# Patient Record
Sex: FEMALE | Race: BLACK OR AFRICAN AMERICAN | NOT HISPANIC OR LATINO | Employment: FULL TIME
[De-identification: names, ages, dates, MRNs, and addresses within clinical notes are randomized per-mention and may not be internally consistent; named-entity substitution may affect disease eponyms.]

---

## 2017-08-08 ENCOUNTER — LAB SERVICES (OUTPATIENT)
Dept: OTHER | Age: 57
End: 2017-08-08

## 2017-08-08 ENCOUNTER — CHARTING TRANS (OUTPATIENT)
Dept: OTHER | Age: 57
End: 2017-08-08

## 2017-08-08 ASSESSMENT — PAIN SCALES - GENERAL: PAINLEVEL_OUTOF10: 0

## 2017-08-19 ENCOUNTER — IMAGING SERVICES (OUTPATIENT)
Dept: OTHER | Age: 57
End: 2017-08-19

## 2017-08-19 ENCOUNTER — HOSPITAL (OUTPATIENT)
Dept: OTHER | Age: 57
End: 2017-08-19
Attending: INTERNAL MEDICINE

## 2017-08-23 ENCOUNTER — CHARTING TRANS (OUTPATIENT)
Dept: OTHER | Age: 57
End: 2017-08-23

## 2017-08-23 LAB
ALBUMIN SERPL-MCNC: 4.2 G/DL (ref 3.6–5.1)
ALBUMIN/GLOB SERPL: 1.1 (ref 1–2.4)
ALP SERPL-CCNC: 107 UNITS/L (ref 45–117)
ALT SERPL-CCNC: 25 UNITS/L
ANION GAP SERPL CALC-SCNC: 15 MMOL/L (ref 10–20)
AST SERPL-CCNC: 19 UNITS/L
BILIRUB SERPL-MCNC: 0.8 MG/DL (ref 0.2–1)
BUN SERPL-MCNC: 17 MG/DL (ref 6–20)
BUN/CREAT SERPL: 26 (ref 7–25)
CALCIUM SERPL-MCNC: 9.1 MG/DL (ref 8.4–10.2)
CHLORIDE SERPL-SCNC: 109 MMOL/L (ref 98–107)
CHOLEST SERPL-MCNC: 198 MG/DL
CHOLEST/HDLC SERPL: 2.8
CO2 SERPL-SCNC: 23 MMOL/L (ref 21–32)
CREAT SERPL-MCNC: 0.64 MG/DL (ref 0.51–0.95)
GLOBULIN SER-MCNC: 3.9 G/DL (ref 2–4)
GLUCOSE SERPL-MCNC: 82 MG/DL (ref 65–99)
HBA1C MFR BLD: 5.8 % (ref 4.5–5.6)
HDLC SERPL-MCNC: 72 MG/DL
LDLC SERPL CALC-MCNC: 105 MG/DL
LENGTH OF FAST TIME PATIENT: 10 HRS
LENGTH OF FAST TIME PATIENT: 10 HRS
NONHDLC SERPL-MCNC: 126 MG/DL
POTASSIUM SERPL-SCNC: 4.4 MMOL/L (ref 3.4–5.1)
SODIUM SERPL-SCNC: 143 MMOL/L (ref 135–145)
TOTAL PROTEIN: 8.1 G/DL (ref 6.4–8.2)
TRIGL SERPL-MCNC: 106 MG/DL

## 2017-09-02 ENCOUNTER — HOSPITAL (OUTPATIENT)
Dept: OTHER | Age: 57
End: 2017-09-02

## 2017-10-26 DIAGNOSIS — R22.0 LOCALIZED SWELLING, MASS, AND LUMP OF HEAD: Primary | ICD-10-CM

## 2018-04-16 DIAGNOSIS — M79.641 RIGHT HAND PAIN: Primary | ICD-10-CM

## 2018-11-03 VITALS
BODY MASS INDEX: 28.21 KG/M2 | DIASTOLIC BLOOD PRESSURE: 70 MMHG | HEART RATE: 74 BPM | RESPIRATION RATE: 16 BRPM | TEMPERATURE: 97.3 F | HEIGHT: 64 IN | WEIGHT: 165.23 LBS | SYSTOLIC BLOOD PRESSURE: 120 MMHG

## 2019-01-15 ENCOUNTER — TELEPHONE (OUTPATIENT)
Dept: SCHEDULING | Age: 59
End: 2019-01-15

## 2019-02-11 PROBLEM — E78.5 HYPERLIPIDEMIA LDL GOAL <100: Status: ACTIVE | Noted: 2019-02-11

## 2019-02-11 PROBLEM — M25.561 ACUTE PAIN OF BOTH KNEES: Status: ACTIVE | Noted: 2019-02-11

## 2019-02-11 PROBLEM — F31.9 BIPOLAR 1 DISORDER: Status: ACTIVE | Noted: 2019-02-11

## 2019-02-11 PROBLEM — M25.562 ACUTE PAIN OF BOTH KNEES: Status: ACTIVE | Noted: 2019-02-11

## 2019-02-11 PROBLEM — F51.01 PRIMARY INSOMNIA: Status: ACTIVE | Noted: 2019-02-11

## 2019-06-01 ENCOUNTER — HOSPITAL (OUTPATIENT)
Dept: OTHER | Age: 59
End: 2019-06-01
Attending: OBSTETRICS & GYNECOLOGY

## 2019-06-13 ENCOUNTER — HOSPITAL (OUTPATIENT)
Dept: OTHER | Age: 59
End: 2019-06-13
Attending: OBSTETRICS & GYNECOLOGY

## 2019-09-10 ENCOUNTER — HOSPITAL ENCOUNTER (EMERGENCY)
Facility: HOSPITAL | Age: 59
Discharge: HOME | End: 2019-09-10
Attending: EMERGENCY MEDICINE
Payer: COMMERCIAL

## 2019-09-10 VITALS
DIASTOLIC BLOOD PRESSURE: 84 MMHG | TEMPERATURE: 98 F | HEART RATE: 59 BPM | OXYGEN SATURATION: 97 % | SYSTOLIC BLOOD PRESSURE: 149 MMHG | RESPIRATION RATE: 18 BRPM | WEIGHT: 237 LBS | BODY MASS INDEX: 39.49 KG/M2 | HEIGHT: 65 IN

## 2019-09-10 DIAGNOSIS — S70.01XA CONTUSION OF RIGHT HIP, INITIAL ENCOUNTER: ICD-10-CM
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
## 2019-09-10 DIAGNOSIS — S40.011A CONTUSION OF RIGHT SHOULDER, INITIAL ENCOUNTER: ICD-10-CM

## 2019-09-10 PROCEDURE — 25000003 PHARM REV CODE 250: Performed by: NURSE PRACTITIONER

## 2019-09-10 PROCEDURE — 99284 EMERGENCY DEPT VISIT MOD MDM: CPT | Mod: 25

## 2019-09-10 RX ORDER — NAPROXEN 250 MG/1
250 TABLET ORAL
Status: COMPLETED | OUTPATIENT
Start: 2019-09-10 | End: 2019-09-10

## 2019-09-10 RX ORDER — CYCLOBENZAPRINE HCL 10 MG
10 TABLET ORAL
Status: COMPLETED | OUTPATIENT
Start: 2019-09-10 | End: 2019-09-10

## 2019-09-10 RX ADMIN — CYCLOBENZAPRINE HYDROCHLORIDE 10 MG: 10 TABLET, FILM COATED ORAL at 01:09

## 2019-09-10 RX ADMIN — NAPROXEN 250 MG: 250 TABLET ORAL at 01:09

## 2019-09-10 NOTE — ED PROVIDER NOTES
Encounter Date: 9/10/2019    SCRIBE #1 NOTE: I, Rose Kaur, am scribing for, and in the presence of,  Fuentes Norris DNP. I have scribed the following portions of the note - Other sections scribed: HPI and ROS.       History     Chief Complaint   Patient presents with    Fall     trip and fall from standing position today. pt reports falling on right side. She denies injury to the head or LOC. Pt c/o right side and lower back pain     CC: Fall    HPI: This 59 y.o female, with a medical history of arthritis, hyperlipidemia, and morbid obesity, presents to the ED s/p a mechanical fall that occurred this morning. Pt reports that she was in [place] attempting to get an item off of the top shelf when she turned, grabbed the display and fell onto the ground. She presently c/o right upper arm pain, right lower back pain and right thigh pain. The symptoms are acute, constant and severe (10/10). Pt denies head trauma or loss of consciousness. No other associated symptoms. No treatment attempted PTA to the ED. No alleviating factors.     The history is provided by the patient. No  was used.     Review of patient's allergies indicates:  No Known Allergies  Past Medical History:   Diagnosis Date    Arthritis     Depression     Glaucoma     Hyperlipidemia     Morbid obesity due to excess calories      Past Surgical History:   Procedure Laterality Date    COLONOSCOPY  2015    ESOPHAGOGASTRODUODENOSCOPY  2015    GASTRECTOMY-SLEEVE-LAPAROSCOPIC W/ INTRA OP EGD N/A 7/1/2015    Performed by [facility]ble [facility] at [facility] OR    GASTRIC BYPASS      HYSTERECTOMY      [place] REMOVAL OF OVARY/TUBE(S)       Family History   Problem Relation Age of Onset    Heart defect Sister     Breast cancer Neg Hx     Colon cancer Neg Hx     Ovarian cancer Neg Hx      Social History     Tobacco Use    Smoking status: Never Smoker    Smokeless tobacco: Never Used   Substance Use Topics    Alcohol use: No     Drug use: No     Review of Systems   Constitutional: Negative for fever.   HENT: Negative for sore throat.    Respiratory: Negative for shortness of breath.    Cardiovascular: Negative for chest pain.   Gastrointestinal: Negative for nausea.   Genitourinary: Negative for dysuria.   Musculoskeletal: Negative for back pain.        (+) right upper arm pain; (+) right lower back pain; (+) right thigh pain   Skin: Negative for rash.   Neurological: Negative for weakness.       Physical Exam     Initial Vitals [09/10/19 1148]   BP Pulse Resp Temp SpO2   (!) 150/83 75 17 98.3 °F (36.8 °C) 95 %      MAP       --         Physical Exam    Nursing note and vitals reviewed.  Constitutional: She appears well-developed and well-nourished.   HENT:   Head: Normocephalic and atraumatic.   Right Ear: External ear normal.   Left Ear: External ear normal.   Nose: Nose normal.   Eyes: Conjunctivae and EOM are normal. Pupils are equal, round, and reactive to light. Right eye exhibits no discharge. Left eye exhibits no discharge.   Neck: Normal range of motion.   Abdominal: She exhibits no distension.   Musculoskeletal: Normal range of motion.        Right shoulder: Normal.        Right hip: Normal.        Right upper arm: Normal.   Pt atraumatic without ecchymoses or bruising.  Right radial pulse intact.  Pt ambulates without difficulty or antalgia. Denies numbness.  Full rom at all joints: right shoulder, right elbow, right wrist, right hip, right knee   Neurological: She is alert and oriented to person, place, and time.   Skin: Skin is dry. Capillary refill takes less than 2 seconds.         ED Course   Procedures  Labs Reviewed - No data to display       Imaging Results    None          Medical Decision Making:   Initial Assessment:   59-year-old female who reports falling onto her right hand side secondary to mechanical fall.  Reports right shoulder and right hip pain. Physical examination is without abnormality.  Differential  Diagnosis:   Fracture, dislocation, sprain, strain, contusion, abrasion  ED Management:  Initial orders include x-ray of the right shoulder, x-ray of the right hip, naproxen 250 mg p.o., Flexeril 10 mg p.o..                   ED Course as of Sep 10 1515   Tue Sep 10, 2019   1435   1. No acute displaced fractures.   X-Ray Humerus 2 View Right [VC]   1435 No acute displaced fractures.   X-Ray Hip 2 View Right [VC]      ED Course User Index  [VC] Fuentes Norris DNP     Clinical Impression:       ICD-10-CM ICD-9-CM   1. Fall, initial encounter W19.XXXA E888.9   2. Fall W19.XXXA E888.9   3. Contusion of right hip, initial encounter S70.01XA 924.01   4. Contusion of right shoulder, initial encounter S40.011A 923.00     Patient is discharged home in good condition to follow up with primary care provider.  Return for any worsening or changes in condition.  She should use Tylenol ibuprofen for pain. Symptomatic therapies and return precautions on AVS.   Medication choices were made after reviewing allergies, medications, history, available laboratories.       Disposition:   Disposition: Discharged  Condition: Stable    MARIBEL TREVINO DNP ACNP-BC FNP-C , personally performed the services described in this documentation. All medical record entries made by the scribe were at my direction and in my presence.  I have reviewed the chart and agree that the record reflects my personal performance and is accurate and complete.                    Fuentes Norris DNP  09/10/19 2124

## 2019-09-10 NOTE — ED TRIAGE NOTES
Pt. Reports she fell landed on her right side from a standing position. Pt. Reports pain to her right forearm, elbow, right thigh and hip and right side of her lower back.

## 2019-09-10 NOTE — DISCHARGE INSTRUCTIONS
Alternate Tylenol and advil every 3 hours for body aches. Return to the Emergency department for any worsening or failure to improve, otherwise follow up with your primary care provider.

## 2019-09-11 ENCOUNTER — HOSPITAL (OUTPATIENT)
Dept: OTHER | Age: 59
End: 2019-09-11
Attending: NURSE PRACTITIONER

## 2020-08-01 ENCOUNTER — HOSPITAL (OUTPATIENT)
Dept: OTHER | Age: 60
End: 2020-08-01
Attending: INTERNAL MEDICINE

## 2020-08-09 ENCOUNTER — HOSPITAL ENCOUNTER (EMERGENCY)
Facility: HOSPITAL | Age: 60
Discharge: HOME | End: 2020-08-09
Attending: EMERGENCY MEDICINE
Payer: COMMERCIAL

## 2020-08-09 VITALS
OXYGEN SATURATION: 98 % | DIASTOLIC BLOOD PRESSURE: 100 MMHG | RESPIRATION RATE: 16 BRPM | HEART RATE: 86 BPM | BODY MASS INDEX: 40.77 KG/M2 | WEIGHT: 245 LBS | TEMPERATURE: 98 F | SYSTOLIC BLOOD PRESSURE: 155 MMHG

## 2020-08-09 DIAGNOSIS — L08.9 INFECTED ABRASION OF RIGHT ELBOW, INITIAL ENCOUNTER: ICD-10-CM

## 2020-08-09 DIAGNOSIS — S50.311A INFECTED ABRASION OF RIGHT ELBOW, INITIAL ENCOUNTER: ICD-10-CM

## 2020-08-09 DIAGNOSIS — S50.01XA CONTUSION OF RIGHT ELBOW, INITIAL ENCOUNTER: Primary | ICD-10-CM

## 2020-08-09 DIAGNOSIS — M25.521 ELBOW PAIN, RIGHT: ICD-10-CM

## 2020-08-09 PROCEDURE — 99284 EMERGENCY DEPT VISIT MOD MDM: CPT | Mod: 25,ER

## 2020-08-09 PROCEDURE — 25000003 PHARM REV CODE 250: Mod: ER | Performed by: EMERGENCY MEDICINE

## 2020-08-09 PROCEDURE — 63600175 PHARM REV CODE 636 W HCPCS: Mod: ER | Performed by: EMERGENCY MEDICINE

## 2020-08-09 PROCEDURE — 90471 IMMUNIZATION ADMIN: CPT | Mod: ER | Performed by: EMERGENCY MEDICINE

## 2020-08-09 PROCEDURE — 90715 TDAP VACCINE 7 YRS/> IM: CPT | Mod: ER | Performed by: EMERGENCY MEDICINE

## 2020-08-09 RX ORDER — IBUPROFEN 800 MG/1
800 TABLET ORAL EVERY 8 HOURS PRN
Qty: 20 TABLET | Refills: 0 | Status: SHIPPED | OUTPATIENT
Start: 2020-08-09 | End: 2020-08-14

## 2020-08-09 RX ORDER — MUPIROCIN 20 MG/G
OINTMENT TOPICAL 3 TIMES DAILY
Qty: 22 G | Refills: 0 | Status: SHIPPED | OUTPATIENT
Start: 2020-08-09 | End: 2020-08-16

## 2020-08-09 RX ORDER — CLINDAMYCIN HYDROCHLORIDE 150 MG/1
300 CAPSULE ORAL EVERY 8 HOURS
Qty: 42 CAPSULE | Refills: 0 | Status: SHIPPED | OUTPATIENT
Start: 2020-08-09 | End: 2020-08-16

## 2020-08-09 RX ORDER — CLINDAMYCIN HYDROCHLORIDE 150 MG/1
300 CAPSULE ORAL
Status: COMPLETED | OUTPATIENT
Start: 2020-08-09 | End: 2020-08-09

## 2020-08-09 RX ADMIN — CLINDAMYCIN HYDROCHLORIDE 300 MG: 150 CAPSULE ORAL at 05:08

## 2020-08-09 RX ADMIN — CLOSTRIDIUM TETANI TOXOID ANTIGEN (FORMALDEHYDE INACTIVATED), CORYNEBACTERIUM DIPHTHERIAE TOXOID ANTIGEN (FORMALDEHYDE INACTIVATED), BORDETELLA PERTUSSIS TOXOID ANTIGEN (GLUTARALDEHYDE INACTIVATED), BORDETELLA PERTUSSIS FILAMENTOUS HEMAGGLUTININ ANTIGEN (FORMALDEHYDE INACTIVATED), BORDETELLA PERTUSSIS PERTACTIN ANTIGEN, AND BORDETELLA PERTUSSIS FIMBRIAE 2/3 ANTIGEN 0.5 ML: 5; 2; 2.5; 5; 3; 5 INJECTION, SUSPENSION INTRAMUSCULAR at 05:08

## 2020-08-09 RX ADMIN — BACITRACIN, NEOMYCIN, POLYMYXIN B 1 EACH: 400; 3.5; 5 OINTMENT TOPICAL at 05:08

## 2020-08-26 ENCOUNTER — HOSPITAL (OUTPATIENT)
Dept: OTHER | Age: 60
End: 2020-08-26
Attending: INTERNAL MEDICINE

## 2020-09-17 NOTE — ED PROVIDER NOTES
"Encounter Date: 8/9/2020    SCRIBE #1 NOTE: I, Anya Morales, am scribing for, and in the presence of,  Dr. Dorantes. I have scribed the following portions of the note - Other sections scribed: HPI, ROS, PE.       History     Chief Complaint   Patient presents with    Fall     Pt states," I fell three days ago and hurt my right elbow."     Rosa Miller is a 60 y.o. female who presents to the ED complaining of right elbow pain after a mechanical fall 3 days ago. Reports she is unable to straighten her right arm all the way out. Reports abrasion to right elbow. Has been washing wound with soap and water and applied witch hazel and neosporin with no relief. Denies numbness, tingling, and weakness. Tetanus not up to date. No known allergies.     The history is provided by the patient. No  was used.     Review of patient's allergies indicates:  No Known Allergies  Past Medical History:   Diagnosis Date    Arthritis     Depression     Glaucoma     Hyperlipidemia     Morbid obesity due to excess calories      Past Surgical History:   Procedure Laterality Date    COLONOSCOPY  2015    ESOPHAGOGASTRODUODENOSCOPY  2015    GASTRIC BYPASS      HYSTERECTOMY      [place] REMOVAL OF OVARY/TUBE(S)       Family History   Problem Relation Age of Onset    Heart defect Sister     Breast cancer Neg Hx     Colon cancer Neg Hx     Ovarian cancer Neg Hx      Social History     Tobacco Use    Smoking status: Never Smoker    Smokeless tobacco: Never Used   Substance Use Topics    Alcohol use: No    Drug use: No     Review of Systems   Constitutional: Negative for fever.   HENT: Negative for sore throat.    Eyes: Negative for visual disturbance.   Respiratory: Negative for shortness of breath.    Cardiovascular: Negative for chest pain.   Gastrointestinal: Negative for abdominal pain.   Genitourinary: Negative for dysuria.   Musculoskeletal: Positive for arthralgias. Negative for back pain.   Skin: " Positive for wound (abrasion). Negative for rash.   Neurological: Negative for weakness, numbness and headaches.       Physical Exam     Initial Vitals [08/09/20 1624]   BP Pulse Resp Temp SpO2   (!) 155/100 86 16 98 °F (36.7 °C) 98 %      MAP       --         Physical Exam    Nursing note and vitals reviewed.  Constitutional: She appears well-developed and well-nourished.   HENT:   Head: Normocephalic and atraumatic.   Right Ear: External ear normal.   Left Ear: External ear normal.   Nose: Nose normal.   Eyes: Conjunctivae and EOM are normal.   Neck: Normal range of motion. Neck supple. No thyromegaly present. No JVD present.   Cardiovascular: Normal rate and regular rhythm. Exam reveals no gallop and no friction rub.    No murmur heard.  Pulmonary/Chest: Breath sounds normal. No respiratory distress.   Abdominal: Soft. Bowel sounds are normal. There is no abdominal tenderness.   Musculoskeletal: No tenderness or edema.      Right elbow: She exhibits decreased range of motion and swelling.      Comments: Abrasion to right elbow with local tissue swelling and erythema extending from abrasion, consistent with early cellulitis. Decreased range of motion of right elbow. Patient only able to extend to 130 degrees. Normal pulse,  strength and capillary refill.    Neurological: She is alert and oriented to person, place, and time. She has normal strength. GCS score is 15. GCS eye subscore is 4. GCS verbal subscore is 5. GCS motor subscore is 6.   Skin: Skin is warm and dry. Capillary refill takes less than 2 seconds. Abrasion noted.   Psychiatric: She has a normal mood and affect.         ED Course   Procedures  Labs Reviewed - No data to display       Imaging Results          X-Ray Elbow Complete Right (Final result)  Result time 08/09/20 17:10:57    Final result by Jordan Pierce MD (08/09/20 17:10:57)                 Impression:      1. No acute displaced fracture or dislocation of the  elbow.      Electronically signed by: Jordan Pierce MD  Date:    08/09/2020  Time:    17:10             Narrative:    EXAMINATION:  XR ELBOW COMPLETE 3 VIEW RIGHT    CLINICAL HISTORY:  . Pain in right elbow    TECHNIQUE:  AP, lateral, and oblique views of the right elbow were performed.    COMPARISON:  None    FINDINGS:  Four views right elbow.    No significant displacement of the anterior or posterior elbow fat pads.  The anterior humeral line and radiocapitellar line are in appropriate orientation.  No acute displaced fracture or dislocation of the elbow.  No radiopaque foreign body.  No significant edema.                                 Medical Decision Making:   History:   Old Medical Records: I decided to obtain old medical records.  Clinical Tests:   Radiological Study: Ordered   right elbow abrasion appears infected.  Some surrounding cellulitis.  X-ray shows no underlying injury.  May have some traumatic bursitis.  Treat with anti-inflammatories as well as oral and topical antibiotics.  Stable for discharge.          Scribe Attestation:   Scribe #1: I performed the above scribed service and the documentation accurately describes the services I performed. I attest to the accuracy of the note.    I, Deshawn Dorantes, personally performed the services described in this documentation. All medical record entries made by the scribe were at my direction and in my presence.  I have reviewed the chart and agree that the record reflects my personal performance and is accurate and complete                        Clinical Impression:     1. Contusion of right elbow, initial encounter    2. Elbow pain, right    3. Infected abrasion of right elbow, initial encounter                ED Disposition Condition    Discharge Stable        ED Prescriptions     Medication Sig Dispense Start Date End Date Auth. Provider    clindamycin (CLEOCIN) 150 MG capsule Take 2 capsules (300 mg total) by mouth every 8 (eight) hours. for 7  days 42 capsule 8/9/2020 8/16/2020 Deshawn Dorantes MD    mupirocin (BACTROBAN) 2 % ointment Apply topically 3 (three) times daily. for 7 days 22 g 8/9/2020 8/16/2020 Deshawn Dorantes MD    ibuprofen (ADVIL,MOTRIN) 800 MG tablet Take 1 tablet (800 mg total) by mouth every 8 (eight) hours as needed for Pain. 20 tablet 8/9/2020 8/14/2020 Deshawn Dorantes MD        Follow-up Information     Follow up With Specialties Details Why Contact Info    [facility] Emergency Department Emergency Medicine  As needed, If symptoms worsen [address] [facility] [address] [phone]                                     Deshawn Dorantes MD  08/09/20 4709     [place] Counseling: I discussed with the patient the risks of risankizumab-rzaa including but not limited to immunosuppression, and serious infections.  The patient understands that monitoring is required including a PPD at baseline and must alert us or the primary physician if symptoms of infection or other concerning signs are noted.

## 2020-12-07 ENCOUNTER — TELEPHONE (OUTPATIENT)
Dept: INTERVENTIONAL RADIOLOGY/VASCULAR | Facility: HOSPITAL | Age: 60
End: 2020-12-07

## 2020-12-07 NOTE — TELEPHONE ENCOUNTER
Attempted to call patient to schedule IR procedure. Unable to reach patient, a voicemail was left with our contact information ([phone]).

## 2020-12-11 ENCOUNTER — HOSPITAL ENCOUNTER (OUTPATIENT)
Dept: RADIOLOGY | Facility: HOSPITAL | Age: 60
Discharge: HOME | End: 2020-12-11
Attending: SURGERY
Payer: MEDICARE

## 2020-12-11 DIAGNOSIS — D21.22 BENIGN NEOPLASM OF CONNECTIVE AND OTHER SOFT TISSUE OF LEFT LOWER LIMB, INCLUDING HIP: ICD-10-CM

## 2020-12-11 DIAGNOSIS — D17.24 BENIGN LIPOMATOUS NEOPLASM OF SKIN AND SUBCUTANEOUS TISSUE OF LEFT LEG: ICD-10-CM

## 2020-12-11 PROCEDURE — 76882 US LMTD JT/FCL EVL NVASC XTR: CPT | Mod: 26,LT,, | Performed by: RADIOLOGY

## 2020-12-11 PROCEDURE — 76882 US SOFT TISSUE, LOWER EXTREMITY, LEFT: ICD-10-PCS | Mod: 26,LT,, | Performed by: RADIOLOGY

## 2020-12-11 PROCEDURE — 76882 US LMTD JT/FCL EVL NVASC XTR: CPT | Mod: TC,LT

## 2020-12-14 ENCOUNTER — HOSPITAL ENCOUNTER (OUTPATIENT)
Dept: INTERVENTIONAL RADIOLOGY/VASCULAR | Facility: HOSPITAL | Age: 60
Discharge: HOME | End: 2020-12-14
Attending: SURGERY
Payer: MEDICARE

## 2020-12-14 DIAGNOSIS — D17.24 LIPOMA OF LEFT THIGH: ICD-10-CM

## 2020-12-14 PROCEDURE — 87205 SMEAR GRAM STAIN: CPT

## 2020-12-14 PROCEDURE — 76942 ECHO GUIDE FOR BIOPSY: CPT | Mod: 26,,, | Performed by: RADIOLOGY

## 2020-12-14 PROCEDURE — 76942 IR ULTRASOUND GUIDANCE: ICD-10-PCS | Mod: 26,,, | Performed by: RADIOLOGY

## 2020-12-14 PROCEDURE — 76942 ECHO GUIDE FOR BIOPSY: CPT | Mod: TC

## 2020-12-14 PROCEDURE — 87070 CULTURE OTHR SPECIMN AEROBIC: CPT

## 2020-12-14 PROCEDURE — 87075 CULTR BACTERIA EXCEPT BLOOD: CPT

## 2020-12-14 PROCEDURE — 10160 PNXR ASPIR ABSC HMTMA BULLA: CPT

## 2020-12-14 PROCEDURE — 10160 IR ABSCESS ASPIRATION: ICD-10-PCS | Mod: ,,, | Performed by: RADIOLOGY

## 2020-12-14 PROCEDURE — 10160 PNXR ASPIR ABSC HMTMA BULLA: CPT | Mod: ,,, | Performed by: RADIOLOGY

## 2020-12-14 NOTE — DISCHARGE SUMMARY
Radiology Discharge Summary      Hospital Course: No complications    Admit Date: 12/14/2020  Discharge Date: 12/14/2020     Instructions Given to Patient: Yes  Diet: Resume prior diet  Activity: activity as tolerated    Description of Condition on Discharge: Stable  Vital Signs (Most Recent):      Discharge Disposition: Home    Discharge Diagnosis: left thigh cystic mass s/p US guided aspiration     Follow-up: with referring MD Jackson Dyson MD  Staff Radiologist  Department of Radiology  Pager: [phone]

## 2020-12-14 NOTE — H&P
Radiology History & Physical      SUBJECTIVE:     Chief Complaint: left thigh mass    History of Present Illness:  Rosa Miller is a 60 y.o. female who presents for US guided aspiration of left thigh cystic mass  Past Medical History:   Diagnosis Date    Arthritis     Depression     Glaucoma     Hyperlipidemia     Morbid obesity due to excess calories      Past Surgical History:   Procedure Laterality Date    COLONOSCOPY  2015    ESOPHAGOGASTRODUODENOSCOPY  2015    GASTRIC BYPASS      HYSTERECTOMY      [place] REMOVAL OF OVARY/TUBE(S)         Home Meds:   Prior to Admission medications    Medication Sig Start Date End Date Taking? Authorizing Provider   acetaminophen (TYLENOL) 500 MG tablet Take 500 mg by mouth daily as needed for Pain.    Historical Provider   ergocalciferol (ERGOCALCIFEROL) 50,000 unit Cap Vitamin D2 50,000 unit capsule   Take 1 capsule every week by oral route.    Historical Provider   fluoxetine (PROZAC) 40 MG capsule  4/29/17   Historical Provider   ketoconazole (NIZORAL) 2 % cream Apply topically.    Historical Provider   latanoprost 0.005 % ophthalmic solution Place 1 capsule into both eyes every evening.  2/12/14   Historical Provider   olopatadine (PATADAY) 0.2 % Drop Pataday 0.2 % eye drops    Historical Provider   risperidone (RISPERDAL) 2 MG tablet  4/25/17   Historical Provider   trazodone (DESYREL) 100 MG tablet Take 100 mg by mouth every evening.  11/29/14   Historical Provider     Anticoagulants/Antiplatelets: no anticoagulation    Allergies: Review of patient's allergies indicates:  No Known Allergies  Sedation History:  no adverse reactions    Review of Systems:   Hematological: negative,no known coagulopathies  Respiratory: no shortness of breath  Cardiovascular: no chest pain  Gastrointestinal: no abdominal pain  Genito-Urinary: no dysuria  Musculoskeletal: negative  Neurological: no TIA or stroke symptoms         OBJECTIVE:     Vital Signs (Most Recent)       Physical  Exam:  ASA: 1  Mallampati: N/A    General: no acute distress  Mental Status: alert and oriented to person, place and time  HEENT: normocephalic, atraumatic  Chest: unlabored breathing  Heart: regular heart rate  Abdomen: nondistended  Extremity: moves all extremities, mass at superior/medial aspect of left thigh    Laboratory  No results found for: INR    Lab Results   Component Value Date    WBC 5.8 03/11/2019    HGB 11.3 (L) 03/11/2019    HCT 35.5 03/11/2019    MCV 76.2 (L) 03/11/2019     03/11/2019      Lab Results   Component Value Date    GLU 98 03/11/2019     03/11/2019    K 4.3 03/11/2019     03/11/2019    CO2 30 03/11/2019    BUN 10 03/11/2019    CREATININE 0.90 03/11/2019    CALCIUM 9.4 03/11/2019    ALT 9 03/11/2019    AST 11 03/11/2019    ALBUMIN 3.9 03/11/2019    BILITOT 0.5 03/11/2019       ASSESSMENT/PLAN:     Sedation Plan: local anesthesia  Patient will undergo US guided aspiration of left thigh cystic mass.    Jackson Dyson MD  Staff Radiologist  Department of Radiology  Pager: [phone]

## 2020-12-14 NOTE — PROCEDURES
[facility]  Interventional Radiology  Procedure     Date: 12/14/2020 Time: 8:57 AM    Pre-Op Diagnosis: left thigh cystic mass    Post-Op Diagnosis: same    Procedure Performed by: Karis    Assistant:     Procedure: US guided aspiration of left thigh cystic mass    Specimen/Tissue Removed: 35 mL thin mcintyre fluid    Estimated Blood Loss: minimal    Procedure Note/Findings: Left thigh cystic mass aspirated under US guidance. Patient tolerated procedure well.        Please refer to dictated report for additional details.

## 2020-12-15 LAB
GRAM STN SPEC: NORMAL
GRAM STN SPEC: NORMAL

## 2020-12-18 LAB
BACTERIA SPEC AEROBE CULT: NO GROWTH
BACTERIA SPEC ANAEROBE CULT: NORMAL

## 2021-12-05 ENCOUNTER — HOSPITAL ENCOUNTER (EMERGENCY)
Facility: HOSPITAL | Age: 61
Discharge: HOME | End: 2021-12-05
Attending: EMERGENCY MEDICINE
Payer: MEDICARE

## 2021-12-05 VITALS
HEART RATE: 69 BPM | BODY MASS INDEX: 40.77 KG/M2 | RESPIRATION RATE: 18 BRPM | DIASTOLIC BLOOD PRESSURE: 73 MMHG | TEMPERATURE: 98 F | OXYGEN SATURATION: 98 % | SYSTOLIC BLOOD PRESSURE: 132 MMHG | HEIGHT: 65 IN

## 2021-12-05 DIAGNOSIS — S89.92XA INJURY OF LEFT KNEE, INITIAL ENCOUNTER: ICD-10-CM

## 2021-12-05 DIAGNOSIS — M25.569 KNEE PAIN: ICD-10-CM

## 2021-12-05 DIAGNOSIS — M17.12 TRICOMPARTMENT OSTEOARTHRITIS OF LEFT KNEE: Primary | ICD-10-CM

## 2021-12-05 DIAGNOSIS — M25.462 EFFUSION OF LEFT KNEE: ICD-10-CM

## 2021-12-05 DIAGNOSIS — M71.22 BAKER'S CYST OF KNEE, LEFT: ICD-10-CM

## 2021-12-05 DIAGNOSIS — M79.606 LEG PAIN: ICD-10-CM

## 2021-12-05 PROCEDURE — 96372 THER/PROPH/DIAG INJ SC/IM: CPT

## 2021-12-05 PROCEDURE — 99284 EMERGENCY DEPT VISIT MOD MDM: CPT | Mod: 25,LT

## 2021-12-05 PROCEDURE — 63600175 PHARM REV CODE 636 W HCPCS: Performed by: NURSE PRACTITIONER

## 2021-12-05 RX ORDER — KETOROLAC TROMETHAMINE 30 MG/ML
30 INJECTION, SOLUTION INTRAMUSCULAR; INTRAVENOUS
Status: COMPLETED | OUTPATIENT
Start: 2021-12-05 | End: 2021-12-05

## 2021-12-05 RX ORDER — NAPROXEN 500 MG/1
500 TABLET ORAL EVERY 12 HOURS PRN
Qty: 20 TABLET | Refills: 0 | OUTPATIENT
Start: 2021-12-05 | End: 2023-09-06

## 2021-12-05 RX ORDER — OXYCODONE AND ACETAMINOPHEN 5; 325 MG/1; MG/1
1 TABLET ORAL
Status: DISCONTINUED | OUTPATIENT
Start: 2021-12-05 | End: 2021-12-05

## 2021-12-05 RX ADMIN — KETOROLAC TROMETHAMINE 30 MG: 30 INJECTION, SOLUTION INTRAMUSCULAR; INTRAVENOUS at 05:12

## 2022-10-27 ENCOUNTER — TELEPHONE (OUTPATIENT)
Dept: OBSTETRICS AND GYNECOLOGY | Facility: CLINIC | Age: 62
End: 2022-10-27
Payer: MEDICARE

## 2022-10-27 NOTE — TELEPHONE ENCOUNTER
Received referral from [facility] via fax. On call to pt, pt is going to schedule at [facility] where she is already established.

## 2023-08-01 ENCOUNTER — HOSPITAL ENCOUNTER (EMERGENCY)
Facility: HOSPITAL | Age: 63
Discharge: HOME | End: 2023-08-01
Attending: STUDENT IN AN ORGANIZED HEALTH CARE EDUCATION/TRAINING PROGRAM
Payer: MEDICARE

## 2023-08-01 VITALS
DIASTOLIC BLOOD PRESSURE: 64 MMHG | TEMPERATURE: 99 F | SYSTOLIC BLOOD PRESSURE: 116 MMHG | OXYGEN SATURATION: 99 % | HEART RATE: 63 BPM | WEIGHT: 230 LBS | RESPIRATION RATE: 18 BRPM | HEIGHT: 65 IN | BODY MASS INDEX: 38.32 KG/M2

## 2023-08-01 DIAGNOSIS — R07.89 CHEST WALL PAIN: ICD-10-CM
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
## 2023-08-01 DIAGNOSIS — S09.90XA INJURY OF HEAD, INITIAL ENCOUNTER: ICD-10-CM

## 2023-08-01 PROCEDURE — 99285 EMERGENCY DEPT VISIT HI MDM: CPT | Mod: 25

## 2023-08-01 PROCEDURE — 96372 THER/PROPH/DIAG INJ SC/IM: CPT | Performed by: STUDENT IN AN ORGANIZED HEALTH CARE EDUCATION/TRAINING PROGRAM

## 2023-08-01 PROCEDURE — 94799 UNLISTED PULMONARY SVC/PX: CPT

## 2023-08-01 PROCEDURE — 63600175 PHARM REV CODE 636 W HCPCS: Performed by: STUDENT IN AN ORGANIZED HEALTH CARE EDUCATION/TRAINING PROGRAM

## 2023-08-01 RX ORDER — KETOROLAC TROMETHAMINE 30 MG/ML
10 INJECTION, SOLUTION INTRAMUSCULAR; INTRAVENOUS
Status: COMPLETED | OUTPATIENT
Start: 2023-08-01 | End: 2023-08-01

## 2023-08-01 RX ADMIN — KETOROLAC TROMETHAMINE 10 MG: 30 INJECTION, SOLUTION INTRAMUSCULAR; INTRAVENOUS at 01:08

## 2023-08-01 NOTE — ED PROVIDER NOTES
Encounter Date: 8/1/2023    SCRIBE #1 NOTE: I, Nelly Valentine, am scribing for, and in the presence of,  Raymond Rubin MD. I have scribed the following portions of the note - Other sections scribed: HPI, PE.       History     Chief Complaint   Patient presents with    Fall     EMS called to 64yo female that stumbled on some stairs and fell down the last 4. No LOC and denied taking blood thinners. Patient did hit head and is having pain in her left face and left side if her torso. Ems reported no crepitus to area but tender on palpation. Small abrasion to left side of nose and broken blood vessel to left eye.      Rosa Miller is a 63 y.o. female, with a PMHx of HLD and Glaucoma, who presents to the ED via EMS after a fall today. Patient reports walking down the stairs to answer the door, as someone was knocking, when she missed a step and then fell 3 steps down and hit the wall. Patient denies feeling lightheaded, dizziness, chest pain, or other symptoms prior to the fall. Patient reports falling on the left side of her face, she complains of left-sided chin and mouth pain, she also has an abrasion to the left side of her nose and a broken blood vessel in the left eye. No LOC or subsequent nausea/vomiting. Patient is also complaining of left-sided chest pain that radiates to the back. No SOB. Patient denies needing assistance to walk as she was able to get up on her own and make her way to her sofa to call EMS. Patient is not on blood thinners. No other exacerbating or alleviating factors. Patient denies hip pain, loc, emesis, nausea, or other associated symptoms. NKDA.     Per EMS, patient had normal vitals signs and a normal glucose.     The history is provided by the patient and the EMS personnel. No  was used.     Review of patient's allergies indicates:  No Known Allergies  Past Medical History:   Diagnosis Date    Arthritis     Depression     Glaucoma     Hyperlipidemia     Morbid  obesity due to excess calories      Past Surgical History:   Procedure Laterality Date    COLONOSCOPY  2015    ESOPHAGOGASTRODUODENOSCOPY  2015    GASTRIC BYPASS      HYSTERECTOMY      [place] REMOVAL OF OVARY/TUBE(S)       Family History   Problem Relation Age of Onset    Heart defect Sister     Breast cancer Neg Hx     Colon cancer Neg Hx     Ovarian cancer Neg Hx      Social History     Tobacco Use    Smoking status: Never    Smokeless tobacco: Never   Substance Use Topics    Alcohol use: No    Drug use: No     Review of Systems    Physical Exam     Initial Vitals [08/01/23 1214]   BP Pulse Resp Temp SpO2   114/66 72 18 98.8 °F (37.1 °C) 97 %      MAP       --         Physical Exam    Nursing note and vitals reviewed.  Constitutional: She appears well-developed and well-nourished. She is not diaphoretic. No distress.   HENT:   Head: Normocephalic and atraumatic.   Small abrasion on nose. No scalp laceration or hematoma. No blood in mouth. No hemotympanum b/l.    Eyes: EOM are normal. Pupils are equal, round, and reactive to light. Left conjunctiva has a hemorrhage.   Neck: Neck supple.   No midline TTP   Normal range of motion.  Cardiovascular:  Normal rate, regular rhythm, normal heart sounds and intact distal pulses.           No murmur heard.  Pulmonary/Chest: Breath sounds normal. No respiratory distress. She has no wheezes. She has no rhonchi. She has no rales.   No increased work of breathing or tachypnea. CTAB. Left chest wall TTP without overlying skin changes   Abdominal: Abdomen is soft. She exhibits no distension. There is no abdominal tenderness. There is no rebound and no guarding.   Musculoskeletal:         General: No tenderness or edema.      Cervical back: Normal range of motion and neck supple.      Comments: Pelvis: stable  Back: no stepoffs or deformities, no midline TTP     Neurological: She is alert and oriented to person, place, and time. She has normal strength. No sensory deficit. GCS score is  15. GCS eye subscore is 4. GCS verbal subscore is 5. GCS motor subscore is 6.   Skin: Skin is warm and dry. Capillary refill takes less than 2 seconds.         ED Course   Procedures  Labs Reviewed - No data to display       Imaging Results              CT Head Without Contrast (Final result)  Result time 08/01/23 13:57:33      Final result by Jordan Pierce MD (08/01/23 13:57:33)                   Impression:      1. No acute intracranial abnormalities noting sequela of chronic microvascular ischemic change and senescent change.  2. No acute displaced fracture or dislocation of the maxillofacial region.  3. Sinus disease.      Electronically signed by: Jordan Pierce MD  Date:    08/01/2023  Time:    13:57               Narrative:    EXAMINATION:  CT HEAD WITHOUT CONTRAST    CLINICAL HISTORY:  Head trauma, moderate-severe;    TECHNIQUE:  Low dose axial images were obtained through the head.  Coronal and sagittal reformations were also performed. Contrast was not administered.  Axial images of the maxillofacial region were obtained at 0.625 mm intervals without administration of IV contrast.  Coronal and sagittal reformatted images were reviewed.    COMPARISON:  04/09/2016    FINDINGS:  There is generalized cerebral volume loss.  There is hypoattenuation in a periventricular fashion, likely sequela of chronic microvascular ischemic change.  There is no evidence of acute major vascular territory infarct, hemorrhage, or mass.  There is no hydrocephalus.  There are no abnormal extra-axial fluid collections.  No acute displaced calvarial fracture.    The bilateral orbital walls, maxillary sinus walls, and zygomatic arches are intact.  The bilateral mandibular condyles are in appropriate location.  The mandible is intact.  No acute displaced fracture or dislocation of the maxillofacial region.  The visualized portions of the cervical spine are intact noting degenerative changes.  The bilateral globes and orbital  content are unremarkable.  The mastoid air cells are clear.  There is mild mucous membrane thickening of the left maxillary sinus.  There is hypo pneumatization of the sphenoid sinuses.                                       CT Maxillofacial Without Contrast (Final result)  Result time 08/01/23 14:07:26      Final result by Jordan Pierce MD (08/01/23 14:07:26)                   Impression:      As above      Electronically signed by: Jordan Pierce MD  Date:    08/01/2023  Time:    14:07               Narrative:    EXAMINATION:  CT MAXILLOFACIAL WITHOUT CONTRAST    CLINICAL HISTORY:  Facial trauma, blunt;    TECHNIQUE:  Low dose axial images, sagittal and coronal reformations were obtained through the face.  Contrast was not administered.    COMPARISON:  None    FINDINGS:  Please see CT head report 08/01/2023.                                       X-Ray Chest AP Portable (Final result)  Result time 08/01/23 13:44:33      Final result by Jordan Pierce MD (08/01/23 13:44:33)                   Impression:      1. No convincing acute cardiopulmonary process.      Electronically signed by: Jordan Pierce MD  Date:    08/01/2023  Time:    13:44               Narrative:    EXAMINATION:  XR CHEST AP PORTABLE    CLINICAL HISTORY:  anterior chest pain s/p fall;    TECHNIQUE:  Single frontal view of the chest was performed.    COMPARISON:  12/15/2016    FINDINGS:  The cardiomediastinal silhouette is not enlarged.  There is no pleural effusion.  The trachea is midline.  The lungs are symmetrically expanded bilaterally without evidence of acute parenchymal process. No large focal consolidation seen.  There is no pneumothorax.  The osseous structures are remarkable for degenerative changes.  There is dextroscoliotic curvature of the spine..                                       Medications   ketorolac injection 9.999 mg (9.999 mg Intramuscular Given 8/1/23 1310)     Medical Decision Making:   History:   Old Medical  Records: I decided to obtain old medical records.  Old Records Summarized: other records.       <> Summary of Records: External documents reviewed   Initial Assessment:   Afebrile, hemodynamically stable 63-year-old female presents status post mechanical fall down 3 stairs with blunt trauma to the left side of the face and left side of the chest  Differential Diagnosis:   Rib fracture, bone contusion, nasal fracture, ICH, SAH, maxillary fracture  Clinical Tests:   Radiological Study: Ordered and Reviewed  ED Management:  Patient's story is consistent with a mechanical fall and I have very low suspicion for any syncope or other factor in her fall. I considered a CT cervical spine but didn't order it because the patient is cleared by the NEXUS criteria. See ED course for additional information.          Scribe Attestation:   Scribe #1: I performed the above scribed service and the documentation accurately describes the services I performed. I attest to the accuracy of the note.        ED Course as of 08/01/23 1434   Tue Aug 01, 2023   1348 X-Ray Chest AP Portable  Chest x-ray independently interpreted by me shows no acute process such as pneumonia, pneumothorax, or pulmonary edema. No acute rib fracture visible.     Given the patient's chest pain occurred after a fall, it's still possible she has a rib fracture, so I will provide the patient with an incentive spirometer to help with pneumonia/atelectasis prevention [BD]   1405 CT Head Without Contrast  CT Head unremarkable without evidence of large-vessel infarct or intracranial hemorrhage   [BD]   1421 CT Maxillofacial Without Contrast  No acute fracture/dislocation found [BD]   1428 Patient's workup is unremarkable for any acute injury.  IV toradol given for pain. She is been instructed on the proper use of an incentive spirometer.  She is able to ambulate without assistance. Patient will be discharged at this time. Patient has been given home care instructions,  follow up instructions, and strict return precautions. They agree with and are comfortable with the plan. Pt's daughter also was present for the plan and is in agreement.  [BD]      ED Course User Index  [BD] Raymond Rubin MD             I, Dr. Raymond Rubin, personally performed the services described in this documentation. All medical record entries made by the scribe were at my direction and in my presence. I have reviewed the chart and agree that the record reflects my personal performance and is accurate and complete.     Clinical Impression:   Final diagnoses:  [W10.8XXA] Fall (on) (from) other stairs and steps, initial encounter (Primary)  [S09.90XA] Injury of head, initial encounter  [R07.89] Chest wall pain        ED Disposition Condition    Discharge Stable          ED Prescriptions    None       Follow-up Information       Follow up With Specialties Details Why Contact Info    Risa Villanueva MD Internal Medicine Schedule an appointment as soon as possible for a visit  To discuss your recent ER visit and any additional concerns that you may have [address] [facility]  [address]  [facility] [address]  [phone]      [facility] Emergency Dept Emergency Medicine Go to  As needed, If symptoms worsen [address] [facility] [id number] [phone]             Raymond Rubin MD  08/01/23 4700

## 2023-08-01 NOTE — DISCHARGE INSTRUCTIONS
It was a pleasure taking care of you today!     Diagnosis: Fall    Home Care:   - Use the incentive spirometer 5-10 times every hour that you're awake. Instructions are attached to these papers.   - You may take Tylenol and Ibuprofen as directed on the packaging for soreness/pain  - You may also use ice packs/heating pads in intervals of 15 minutes    Follow-Up Plan:  - Follow-up with primary care doctor within 3 - 5 days to discuss your recent ER visit and any additional concerns that you may have.  - Additional testing and/or evaluation as directed by your primary doctor    Return to the Emergency Department for symptoms including but not limited to: persistence or worsening of symptoms, shortness of breath or chest pain, inability to drink without vomiting, passing out/fainting/ loss of consciousness, or if you have other concerns.

## 2023-08-01 NOTE — ED TRIAGE NOTES
EMS called to 62yo female that stumbled on some stairs and fell down the last 4. No LOC and denied taking blood thinners. Patient did hit head and is having pain in her left face and left side if her torso. Ems reported no crepitus to area but tender on palpation. Small abrasion to left side of nose and broken blood vessel to left eye.

## 2023-08-31 ENCOUNTER — HOSPITAL ENCOUNTER (EMERGENCY)
Facility: HOSPITAL | Age: 63
Discharge: HOME | End: 2023-08-31
Attending: EMERGENCY MEDICINE
Payer: MEDICARE

## 2023-08-31 VITALS
WEIGHT: 260 LBS | TEMPERATURE: 99 F | OXYGEN SATURATION: 98 % | SYSTOLIC BLOOD PRESSURE: 126 MMHG | BODY MASS INDEX: 43.27 KG/M2 | DIASTOLIC BLOOD PRESSURE: 63 MMHG | HEART RATE: 64 BPM | RESPIRATION RATE: 18 BRPM

## 2023-08-31 DIAGNOSIS — M54.6 LEFT-SIDED THORACIC BACK PAIN: Primary | ICD-10-CM

## 2023-08-31 LAB
BILIRUB UR QL STRIP: NEGATIVE
CLARITY UR: CLEAR
COLOR UR: COLORLESS
GLUCOSE UR QL STRIP: NEGATIVE
HGB UR QL STRIP: NEGATIVE
KETONES UR QL STRIP: NEGATIVE
LEUKOCYTE ESTERASE UR QL STRIP: NEGATIVE
NITRITE UR QL STRIP: NEGATIVE
PH UR STRIP: 6 [PH] (ref 5–8)
PROT UR QL STRIP: NEGATIVE
SP GR UR STRIP: 1.01 (ref 1–1.03)
URN SPEC COLLECT METH UR: ABNORMAL
UROBILINOGEN UR STRIP-ACNC: NEGATIVE EU/DL

## 2023-08-31 PROCEDURE — 93005 ELECTROCARDIOGRAM TRACING: CPT

## 2023-08-31 PROCEDURE — 99284 EMERGENCY DEPT VISIT MOD MDM: CPT | Mod: 25

## 2023-08-31 PROCEDURE — 81003 URINALYSIS AUTO W/O SCOPE: CPT | Performed by: PHYSICIAN ASSISTANT

## 2023-08-31 PROCEDURE — 93010 EKG 12-LEAD: ICD-10-PCS | Mod: ,,, | Performed by: INTERNAL MEDICINE

## 2023-08-31 PROCEDURE — 93010 ELECTROCARDIOGRAM REPORT: CPT | Mod: ,,, | Performed by: INTERNAL MEDICINE

## 2023-08-31 RX ORDER — METHOCARBAMOL 500 MG/1
1000 TABLET, FILM COATED ORAL 3 TIMES DAILY PRN
Qty: 20 TABLET | Refills: 0 | Status: SHIPPED | OUTPATIENT
Start: 2023-08-31 | End: 2023-08-31 | Stop reason: SDUPTHER

## 2023-08-31 RX ORDER — METHOCARBAMOL 500 MG/1
1000 TABLET, FILM COATED ORAL 3 TIMES DAILY PRN
Qty: 20 TABLET | Refills: 0 | Status: SHIPPED | OUTPATIENT
Start: 2023-08-31 | End: 2023-09-05

## 2023-09-01 NOTE — DISCHARGE INSTRUCTIONS
Get some good rest.  Ice or heating pad to the area may help with stiffness and soreness.  Robaxin as needed for continued stiffness/soreness. Be aware, this medication is sedating.  Do not mix with alcohol or any other sedating medications.  Do not drive or operate machinery when taking this medication.     Follow-up with primary care provider for re-evaluation should your symptoms persist.    Return to this ED if you develop worsening pain, if you develop leg weakness, if unable to walk or bear weight, if you begin with shortness of breath or chest pain, if you begin with severe cough, if you develop fever, if any other problems occur.

## 2023-09-01 NOTE — ED PROVIDER NOTES
Encounter Date: 8/31/2023       History     Chief Complaint   Patient presents with    Back Pain     Since a  fall last month, taking ore tylenol and ibuprofen this week     62yo F presents to ED with atraumatic left-sided thoracic back pain x7 days.    Patient seen in this ED on 08/01/2023 following a mechanical fall.  Patient states she did not injure her back in the fall.  She admits to new onset left-sided thoracic back pain over the past 7 days.  Pain is described as a dull ache, sometimes sharp, worse with deep inspiration, worse with palpation. She does admit to occasional cough, denies any persistent or worsening cough.  She denies shortness of breath.  No fever, chills, myalgias.  No trauma.  No rash.  No urinary complaints.  She denies abdominal pain.  There is no relief with ibuprofen or Tylenol.  Denies any alleviating factors.  No radiation symptoms.  Denies history of injury or surgery to this area.  She denies chest pain.  She denies syncope or near-syncope, palpitations, nausea vomiting. No hx ACS. Normal appetite and intake.     PMH:  Other abnormalities of gait and mobility  JASON  Carpal tunnel syndrome, bilateral  Bipolar disorder  NIDDM  Obesity  HTN  HLD  Obesity      Review of patient's allergies indicates:  No Known Allergies  Past Medical History:   Diagnosis Date    Arthritis     Depression     Glaucoma     Hyperlipidemia     Morbid obesity due to excess calories      Past Surgical History:   Procedure Laterality Date    COLONOSCOPY  2015    ESOPHAGOGASTRODUODENOSCOPY  2015    GASTRIC BYPASS      HYSTERECTOMY      [place] REMOVAL OF OVARY/TUBE(S)       Family History   Problem Relation Age of Onset    Heart defect Sister     Breast cancer Neg Hx     Colon cancer Neg Hx     Ovarian cancer Neg Hx      Social History     Tobacco Use    Smoking status: Never    Smokeless tobacco: Never   Substance Use Topics    Alcohol use: No    Drug use: No     Review of Systems   Constitutional:  Negative for  appetite change, chills and fever.   Respiratory:  Positive for cough. Negative for shortness of breath.    Cardiovascular:  Negative for chest pain.   Gastrointestinal:  Negative for abdominal pain, nausea and vomiting.   Genitourinary:  Negative for dysuria and flank pain.   Musculoskeletal:  Positive for back pain. Negative for gait problem, myalgias, neck pain and neck stiffness.   Skin:  Negative for rash and wound.   Neurological:  Negative for syncope.       Physical Exam     Initial Vitals [08/31/23 1914]   BP Pulse Resp Temp SpO2   127/62 88 18 98.8 °F (37.1 °C) 100 %      MAP       --         Physical Exam    Nursing note and vitals reviewed.  Constitutional: She appears well-developed and well-nourished. She is not diaphoretic. No distress.   HENT:   Head: Normocephalic and atraumatic.   Neck: Neck supple.   Normal range of motion.  Cardiovascular:  Normal rate and regular rhythm.           1+ radial bilaterally.  1+PT bilaterally. Normal sinus rhythm.  No pretibial edema.  No unilateral leg swelling or calf tenderness. 1/6 holosystolic murmur.    Pulmonary/Chest: No respiratory distress.   Abdominal: Abdomen is soft. Bowel sounds are normal. There is no abdominal tenderness.   Musculoskeletal:         General: Normal range of motion.      Cervical back: Normal range of motion and neck supple.      Comments: There is tenderness to palpation to the left-sided thoracic paraspinal musculature.  No midline spinal tenderness.     Neurological: She is alert and oriented to person, place, and time. GCS score is 15. GCS eye subscore is 4. GCS verbal subscore is 5. GCS motor subscore is 6.   Skin: Skin is warm. Capillary refill takes less than 2 seconds.   Psychiatric: She has a normal mood and affect. Thought content normal.         ED Course   Procedures  Labs Reviewed   URINALYSIS, REFLEX TO URINE CULTURE - Abnormal; Notable for the following components:       Result Value    Color, UA Colorless (*)     All  other components within normal limits    Narrative:     Specimen Source->Urine     EKG Readings: (Independently Interpreted)   Normal sinus rhythm, ventricular rate 63 beats per minute.  Normal ID, normal QT, normal QRS duration.  No right axis deviation.  No ST elevation.  Incomplete right bundle-branch block.  Previous inverted T-wave lead 3, AVF resolved compared to previous dated 12/15/2016.  New right bundle incomplete.       Imaging Results              X-Ray Chest PA And Lateral (Final result)  Result time 08/31/23 22:52:43      Final result by Edward Sparks MD (08/31/23 22:52:43)                   Impression:      No acute intrathoracic process.      Electronically signed by: Edward Sparks MD  Date:    08/31/2023  Time:    22:52               Narrative:    EXAMINATION:  XR CHEST PA AND LATERAL    CLINICAL HISTORY:  Pain in thoracic spine    TECHNIQUE:  PA and lateral views of the chest were performed.    COMPARISON:  08/01/2023.    FINDINGS:  The trachea is unremarkable.  The cardiomediastinal silhouette is within limits.  The hilar structures are unremarkable.  There is no evidence of free air beneath the hemidiaphragms.  There are no pleural effusions.  There is no evidence of a pneumothorax.  There is no evidence of pneumomediastinum.  No airspace opacity is present.  There are degenerative changes in the osseous structures.                                    X-Rays:   Independently Interpreted Readings:   Chest X-Ray: Personal interpretation:  No significant cardiomegaly, no effusion, no dense consolidation, no pneumothorax.     Medications - No data to display  Medical Decision Making  Differential diagnosis: Thoracic strain, pneumonia, costochondritis, ACS, aortic dissection, PE    Amount and/or Complexity of Data Reviewed  Radiology: ordered and independent interpretation performed. Decision-making details documented in ED Course.  ECG/medicine tests: ordered and independent interpretation performed.  Decision-making details documented in ED Course.  Discussion of management or test interpretation with external provider(s): No SOB. No significant tachycardia. No hypoxia. No hx VTE. No exogenous estrogen. No leg swelling or calf pain. Reproducible tenderness on exam. Think unlikely PE as culprit of pt's pleuritic pain. No cough. No fever. No consolidation on imaging.     EKG without any convincing evidence of ischemia.  New right bundle-branch block.  Previous T-wave inversions resolved.  Pain is constant, worse with palpation and movement.  Despite comorbidities, think unlikely cardiac origin of her discomfort.    No significant hypertension.  No ripping/shearing discomfort.  Symmetric upper and lower extremity pulses.  Think unlikely aortic dissection.    Will treat with muscle relaxers should this be thoracic muscular strain.  Discussed red flags and reasons for return.  Advised follow-up with primary care provider for re-evaluation should your symptoms persist.  Patient feels comfortable with current plan.    Risk  Prescription drug management.                               Clinical Impression:   Final diagnoses:  [M54.6] Left-sided thoracic back pain (Primary)        ED Disposition Condition    Discharge Stable          ED Prescriptions       Medication Sig Dispense Start Date End Date Auth. Provider    methocarbamoL (ROBAXIN) 500 MG Tab  (Status: Discontinued) Take 2 tablets (1,000 mg total) by mouth 3 (three) times daily as needed (Stiffness/soreness). 20 tablet 8/31/2023 8/31/2023 Gonzalo Callejas PA-C    methocarbamoL (ROBAXIN) 500 MG Tab Take 2 tablets (1,000 mg total) by mouth 3 (three) times daily as needed (Stiffness/soreness). 20 tablet 8/31/2023 9/5/2023 Gonzalo Callejas PA-C          Follow-up Information       Follow up With Specialties Details Why Contact Info    Risa Villanueva MD Internal Medicine Schedule an appointment as soon as possible for a visit  For reevaluation, If  symptoms persist [address]  [address]  [facility] [address]  [phone]               Gonzalo Callejas, PA-C  09/01/23 0452

## 2023-09-01 NOTE — ED TRIAGE NOTES
Pt presents to ED c/o mid back pain radiates to lower back onset x 1 month after a fall.  Pt also c/o slight headache and neck pain. Denies any other symptoms.  Pt reports taking IBu around 0900 this morning with no relief.  Pain 10/10.

## 2023-09-06 ENCOUNTER — HOSPITAL ENCOUNTER (EMERGENCY)
Facility: HOSPITAL | Age: 63
Discharge: HOME | End: 2023-09-06
Attending: EMERGENCY MEDICINE
Payer: MEDICARE

## 2023-09-06 VITALS
DIASTOLIC BLOOD PRESSURE: 60 MMHG | BODY MASS INDEX: 38.32 KG/M2 | OXYGEN SATURATION: 100 % | WEIGHT: 230 LBS | RESPIRATION RATE: 16 BRPM | HEIGHT: 65 IN | TEMPERATURE: 98 F | SYSTOLIC BLOOD PRESSURE: 118 MMHG | HEART RATE: 66 BPM

## 2023-09-06 DIAGNOSIS — M51.34 THORACIC DEGENERATIVE DISC DISEASE: Primary | ICD-10-CM

## 2023-09-06 PROCEDURE — 96372 THER/PROPH/DIAG INJ SC/IM: CPT | Performed by: EMERGENCY MEDICINE

## 2023-09-06 PROCEDURE — 63600175 PHARM REV CODE 636 W HCPCS: Performed by: EMERGENCY MEDICINE

## 2023-09-06 PROCEDURE — 99285 EMERGENCY DEPT VISIT HI MDM: CPT | Mod: 25

## 2023-09-06 RX ORDER — TIZANIDINE 4 MG/1
4 TABLET ORAL EVERY 8 HOURS PRN
Qty: 30 TABLET | Refills: 0 | Status: SHIPPED | OUTPATIENT
Start: 2023-09-06 | End: 2023-09-16

## 2023-09-06 RX ORDER — KETOROLAC TROMETHAMINE 30 MG/ML
15 INJECTION, SOLUTION INTRAMUSCULAR; INTRAVENOUS
Status: COMPLETED | OUTPATIENT
Start: 2023-09-06 | End: 2023-09-06

## 2023-09-06 RX ORDER — IBUPROFEN 600 MG/1
600 TABLET ORAL EVERY 6 HOURS PRN
Qty: 20 TABLET | Refills: 0 | Status: SHIPPED | OUTPATIENT
Start: 2023-09-06

## 2023-09-06 RX ADMIN — KETOROLAC TROMETHAMINE 15 MG: 30 INJECTION, SOLUTION INTRAMUSCULAR; INTRAVENOUS at 03:09

## 2023-09-06 NOTE — DISCHARGE INSTRUCTIONS
CT scan does not show any broken bones however there is arthritis in your back which is likely the cause of your symptoms.  You can attempt treatment with tizanidine instead of Robaxin.  You can attempt treatment with ibuprofen as well.  Be sure to take food with ibuprofen to prevent upset stomach.  Contact your primary physician to determine if any adjustments your medication regiment are warranted.  You may need to follow-up with Physical Medicine and Rehabilitation for physical therapy.  Return to the emergency department for any new, worsening or significantly concerning symptoms.    Thank you for coming to our Emergency Department today. It is important to remember that some problems are difficult to diagnose and may not be found during your first visit. Be sure to follow up with your primary care doctor and review any labs/imaging that was performed with them. If you do not have a primary care doctor, you may contact the one listed on your discharge paperwork or you may also call the [facility] Appointment Desk at [phone] to schedule an appointment with one.     All medications may potentially have side effects and it is impossible to predict which medications may give you side effects. If you feel that you are having a negative effect of any medication you should immediately stop taking them and seek medical attention.    Return to the ER with any questions/concerns, new/concerning symptoms, worsening or failure to improve. Do not drive or make any important decisions for 24 hours if you have received any pain medications, sedatives or mood altering drugs during your ER visit.

## 2023-09-06 NOTE — ED TRIAGE NOTES
Back Pain (LEFT thoracic since fall on 8/1. Has been seen here for same x2. States now it is getting difficult to ambulate. Taking robaxin without relief. ) GABO

## 2023-09-06 NOTE — ED PROVIDER NOTES
Encounter Date: 9/6/2023    SCRIBE #1 NOTE: I, Aminta Melara, am scribing for, and in the presence of,  Nora Mendoza MD. I have scribed the following portions of the note - Other sections scribed: HI, ROS, MDM.       History     Chief Complaint   Patient presents with    Back Pain     LEFT thoracic since fall on 8/1. Has been seen here for same x2. States now it is getting difficult to ambulate. Taking robaxin without relief.      This is a 63 year old female with a PMHx of Arthritis and HLD who presents to the ED complaining of Left Thoracic Back Pain since 8/1/23. Patient endorses being seen in the ED twice; where on 8/1 she had a fall after miscalculating a step and hit her knee and on 8/31 she presented for Thoracic Back Pain. Patient also endorses being prescribed Robaxin with no relief. No other alleviating or exacerbating factors. Patient denies chest pain or other associated symptoms.This is the extent of the patient's complaints in the ED. NKDA.       Per review of charts, patient was seen in this ED on 08/01/2023 following a mechanical fall.  Patient states she did not injure her back in the fall.  On 8/31/23 patient was seen for new onset left-sided thoracic back pain over the past 7 days with associated cough.       The history is provided by the patient. No  was used.     Review of patient's allergies indicates:  No Known Allergies  Past Medical History:   Diagnosis Date    Arthritis     Depression     Glaucoma     Hyperlipidemia     Morbid obesity due to excess calories      Past Surgical History:   Procedure Laterality Date    COLONOSCOPY  2015    ESOPHAGOGASTRODUODENOSCOPY  2015    GASTRIC BYPASS      HYSTERECTOMY      ND REMOVAL OF OVARY/TUBE(S)       Family History   Problem Relation Age of Onset    Heart defect Sister     Breast cancer Neg Hx     Colon cancer Neg Hx     Ovarian cancer Neg Hx      Social History     Tobacco Use    Smoking status: Never    Smokeless tobacco:  Never   Substance Use Topics    Alcohol use: No    Drug use: No     Review of Systems   Constitutional:  Negative for fever.   HENT:  Negative for facial swelling and voice change.    Eyes:  Negative for pain.   Respiratory:  Negative for choking and shortness of breath.    Cardiovascular:  Negative for chest pain.   Gastrointestinal:  Negative for abdominal pain, nausea and vomiting.   Genitourinary:  Negative for dysuria and frequency.   Musculoskeletal:  Positive for back pain (Left Thoracic).   Skin:  Negative for rash.   Neurological:  Negative for weakness and numbness.   Psychiatric/Behavioral:  Negative for self-injury.        Physical Exam     Initial Vitals [09/06/23 1244]   BP Pulse Resp Temp SpO2   121/63 68 17 98.1 °F (36.7 °C) 100 %      MAP       --         Physical Exam    Nursing note and vitals reviewed.  Constitutional: She is not diaphoretic. No distress.   HENT:   Head: Normocephalic and atraumatic.   Protecting airway   Eyes: Conjunctivae and EOM are normal. No scleral icterus.   Neck: Neck supple. No tracheal deviation present.   Normal range of motion.  Cardiovascular:  Normal rate, regular rhythm and intact distal pulses.           Pulmonary/Chest: No stridor. No respiratory distress.   Speaking in full sentences   Abdominal: Abdomen is soft. She exhibits no distension. There is no abdominal tenderness.   Musculoskeletal:         General: No edema.      Cervical back: Normal range of motion and neck supple.      Comments: Thoracic paraspinal muscular tenderness   No midline tenderness or step-off     Neurological: She is alert. She has normal strength. No cranial nerve deficit or sensory deficit.   Skin: Skin is warm and dry.   Psychiatric: She has a normal mood and affect.         ED Course   Procedures  Labs Reviewed - No data to display       Imaging Results              CT Thoracic Spine Without Contrast (Final result)  Result time 09/06/23 14:12:42      Final result by Librado Turner  III, MD (09/06/23 14:12:42)                   Impression:      There is no evidence of compression fracture.  There is DJD of the T-spine which is mostly mild but becomes moderate at mid and lower thoracic levels with some fairly significant degenerative facet changes at mid and lower thoracic levels.      Electronically signed by: Librado Turner MD  Date:    09/06/2023  Time:    14:12               Narrative:    EXAMINATION:  CT THORACIC SPINE WITHOUT CONTRAST    CLINICAL HISTORY:  Mid-back pain, compression fracture suspected;    FINDINGS:  There is mild degenerative change throughout the thoracic spine, upper L-spine, lower C-spine.  There is a dextroconvex curvature of the spine.  There is moderate dish at lower thoracic levels.  Posterior elements are intact.  The facets are well aligned.  No fracture, dislocation, or bone destruction seen.  No spinal canal stenosis or neural foraminal stenosis is seen.  The lungs are clear.  Visualized mediastinum and abdominal structures show nothing unusual.                        Final result by Librado Turner III, MD (09/06/23 14:12:42)                   Impression:      There is no evidence of compression fracture.  There is DJD of the T-spine which is mostly mild but becomes moderate at mid and lower thoracic levels with some fairly significant degenerative facet changes at mid and lower thoracic levels.      Electronically signed by: Librado Turner MD  Date:    09/06/2023  Time:    14:12               Narrative:    EXAMINATION:  CT THORACIC SPINE WITHOUT CONTRAST    CLINICAL HISTORY:  Mid-back pain, compression fracture suspected;    FINDINGS:  There is mild degenerative change throughout the thoracic spine, upper L-spine, lower C-spine.  There is a dextroconvex curvature of the spine.  There is moderate dish at lower thoracic levels.  Posterior elements are intact.  The facets are well aligned.  No fracture, dislocation, or bone destruction seen.  No spinal canal  stenosis or neural foraminal stenosis is seen.  The lungs are clear.  Visualized mediastinum and abdominal structures show nothing unusual.                                       Medications   ketorolac injection 15 mg (15 mg Intramuscular Given 9/6/23 1506)     Medical Decision Making  63 year old female complaining of Left Thoracic Back Pain since 8/1/23. Patient endorses being seen in the ED twice; where on 8/1 she had a fall after miscalculating a step and hit her knee and on 8/31 she presented for Thoracic Back Pain. Patient also endorses being prescribed Robaxin with no relief.     Per review of charts, patient was seen in this ED on 08/01/2023 following a mechanical fall.  Patient states she did not injure her back in the fall.  On 8/31/23 patient was seen for new onset left-sided thoracic back pain over the past 7 days with associated cough.     Differential includes but not limited to: strain, sprain, fracture, spondylolisthesis    Amount and/or Complexity of Data Reviewed  Radiology: ordered. Decision-making details documented in ED Course.    Risk  Prescription drug management.            Scribe Attestation:   Scribe #1: I performed the above scribed service and the documentation accurately describes the services I performed. I attest to the accuracy of the note.        ED Course as of 09/06/23 1810   Wed Sep 06, 2023   1529 Patient is afebrile and in no acute distress at time history and physical.  She has no midline vertebral tenderness or step-offs.  She has persistent symptoms despite use of Tylenol and Robaxin.  Previous imaging reviewed and did not demonstrate acute traumatic injury. [SG]   1530 CT Thoracic Spine Without Contrast  CT demonstrates degenerative changes of thoracic spine which is likely related to patient's pain.  There is no acute fracture or spondylolisthesis. [SG]   1530 Patient remains with full and symmetrical strength and sensation bilateral upper and lower extremities.  I have  low clinical suspicion of cauda equina syndrome.  Symptoms appear related to thoracic degenerative disc disease.  She is clinically stable and fit for discharge on trial of management with alternative muscle relaxant, cautious use of NSAID.  Referred to her primary physician for follow-up.  counseled on supportive care, appropriate medication usage, concerning symptoms for which to return to ER and the importance of follow up. Understanding and agreement with treatment plan was expressed.  [SG]      ED Course User Index  [SG] Nora Mendoza MD          This chart was completed using dictation software, as a result there may be some transcription errors.           Clinical Impression:   Final diagnoses:  [M51.34] Thoracic degenerative disc disease (Primary)        ED Disposition Condition    Discharge Stable          ED Prescriptions       Medication Sig Dispense Start Date End Date Auth. Provider    ibuprofen (ADVIL,MOTRIN) 600 MG tablet Take 1 tablet (600 mg total) by mouth every 6 (six) hours as needed for Pain or Temperature greater than (100.5). Take with food to prevent upset stomach 20 tablet 9/6/2023 -- Nora Mendoza MD    tiZANidine (ZANAFLEX) 4 MG tablet Take 1 tablet (4 mg total) by mouth every 8 (eight) hours as needed (back pain, muscle tightness). 30 tablet 9/6/2023 9/16/2023 Nora Mendoza MD          Follow-up Information       Follow up With Specialties Details Why Contact Info    Froylan Morris MD Internal Medicine, Oncology, Hematology and Oncology Call in 1 day  [address] [facility]  [address]  [facility] [address]  [phone]      [facility] PHYSICAL MEDICINE & REHAB Physical Medicine and Rehabilitation Schedule an appointment as soon as possible for a visit   [facility] [address] [facility] [address]  [phone]             Nora Mendoza MD  09/06/23 1813

## 2023-09-20 ENCOUNTER — HOSPITAL ENCOUNTER (EMERGENCY)
Facility: HOSPITAL | Age: 63
Discharge: HOME | End: 2023-09-20
Attending: EMERGENCY MEDICINE
Payer: MEDICARE

## 2023-09-20 VITALS
SYSTOLIC BLOOD PRESSURE: 117 MMHG | HEART RATE: 72 BPM | DIASTOLIC BLOOD PRESSURE: 70 MMHG | RESPIRATION RATE: 18 BRPM | BODY MASS INDEX: 34.99 KG/M2 | TEMPERATURE: 98 F | HEIGHT: 65 IN | WEIGHT: 210 LBS | OXYGEN SATURATION: 100 %

## 2023-09-20 DIAGNOSIS — L03.012 PARONYCHIA OF LEFT INDEX FINGER: Primary | ICD-10-CM

## 2023-09-20 LAB — POCT GLUCOSE: 106 MG/DL (ref 70–110)

## 2023-09-20 PROCEDURE — 25000003 PHARM REV CODE 250: Performed by: NURSE PRACTITIONER

## 2023-09-20 PROCEDURE — 26010 DRAINAGE OF FINGER ABSCESS: CPT | Mod: F1

## 2023-09-20 PROCEDURE — 82962 GLUCOSE BLOOD TEST: CPT

## 2023-09-20 PROCEDURE — 99284 EMERGENCY DEPT VISIT MOD MDM: CPT | Mod: 25

## 2023-09-20 PROCEDURE — 10060 I&D ABSCESS SIMPLE/SINGLE: CPT

## 2023-09-20 RX ORDER — CEPHALEXIN 500 MG/1
500 CAPSULE ORAL
Status: COMPLETED | OUTPATIENT
Start: 2023-09-20 | End: 2023-09-20

## 2023-09-20 RX ORDER — MUPIROCIN 20 MG/G
OINTMENT TOPICAL 3 TIMES DAILY
Qty: 15 G | Refills: 0 | Status: SHIPPED | OUTPATIENT
Start: 2023-09-20 | End: 2023-09-27

## 2023-09-20 RX ORDER — CEPHALEXIN 500 MG/1
500 CAPSULE ORAL 3 TIMES DAILY
Qty: 21 CAPSULE | Refills: 0 | Status: SHIPPED | OUTPATIENT
Start: 2023-09-20 | End: 2023-09-27

## 2023-09-20 RX ORDER — MUPIROCIN 20 MG/G
1 OINTMENT TOPICAL
Status: COMPLETED | OUTPATIENT
Start: 2023-09-20 | End: 2023-09-20

## 2023-09-20 RX ADMIN — MUPIROCIN 22 G: 20 OINTMENT TOPICAL at 09:09

## 2023-09-20 RX ADMIN — CEPHALEXIN 500 MG: 500 CAPSULE ORAL at 09:09

## 2023-09-20 NOTE — DISCHARGE INSTRUCTIONS
Please make sure to maintain good hygiene. Use warm compresses 10-15 minutes, 3-4 times a day to help increase wound drainage and decrease swelling, and take your antibiotic medication as prescribed.     Please return to the Emergency Department for any new or worsening problems including: worsening of your abscess, increasing redness or redness extending further up your body, or temperature of greater than 100.4F.

## 2023-09-20 NOTE — ED PROVIDER NOTES
"Encounter Date: 9/20/2023    SCRIBE #1 NOTE: I, Rose Kaur, am scribing for, and in the presence of,  Leticia Salinas NP. I have scribed the following portions of the note - Other sections scribed: HPI, ROS.       History     Chief Complaint   Patient presents with    Finger Pain     Pt reports pain and discoloration to tip of left index finger x1 week. Pt reports that today she woke up with the finger swollen "so bad that I cant bend it". Pt denies fever/chills or known trauma or injury.     CC: Finger Pain    HPI: This 63 y.o female, with a medical history of Arthritis, and Hyperlipidemia, presents to the ED c/o left index finger pain that began 1x week ago. Pt reports use of peroxide and warm salt soaks for treatment. She states that she is now unable to bend the finger and notes that she awoke this morning with swelling to the area. No known trauma or injuries. She does not bite her fingernails. Of note, she reports that she had her nails done at a salon 2 weeks ago. Pt denies drainage or any other associated symptoms.     The history is provided by the patient.     Review of patient's allergies indicates:  No Known Allergies  Past Medical History:   Diagnosis Date    Arthritis     Depression     Glaucoma     Hyperlipidemia     Morbid obesity due to excess calories      Past Surgical History:   Procedure Laterality Date    COLONOSCOPY  2015    ESOPHAGOGASTRODUODENOSCOPY  2015    GASTRIC BYPASS      HYSTERECTOMY      [place] REMOVAL OF OVARY/TUBE(S)       Family History   Problem Relation Age of Onset    Heart defect Sister     Breast cancer Neg Hx     Colon cancer Neg Hx     Ovarian cancer Neg Hx      Social History     Tobacco Use    Smoking status: Never    Smokeless tobacco: Never   Substance Use Topics    Alcohol use: No    Drug use: No     Review of Systems   Constitutional:  Negative for fever.   HENT:  Negative for sore throat.    Respiratory:  Negative for shortness of breath.    Cardiovascular:  " Negative for chest pain.   Gastrointestinal:  Negative for nausea.   Genitourinary:  Negative for dysuria.   Musculoskeletal:  Negative for back pain.        (+) left index finger pain with swelling   Skin:  Negative for rash.   Neurological:  Negative for weakness.       Physical Exam     Initial Vitals [09/20/23 0831]   BP Pulse Resp Temp SpO2   117/70 72 18 98.1 °F (36.7 °C) 100 %      MAP       --         Physical Exam    Constitutional: She appears well-developed and well-nourished. She is not diaphoretic. No distress.   HENT:   Head: Normocephalic and atraumatic.   Neck:   Normal range of motion.  Cardiovascular:  Intact distal pulses.           Pulmonary/Chest: No respiratory distress.   Musculoskeletal:         General: Normal range of motion.      Left hand: Tenderness present.      Cervical back: Normal range of motion.      Comments: Painful swelling and erythema with fluctuance to the lateral nail fold of left index finger.     Neurological: She is alert and oriented to person, place, and time.   Skin: Skin is warm and dry.   Psychiatric: She has a normal mood and affect. Her behavior is normal.         ED Course   I & D - Incision and Drainage    Date/Time: 9/20/2023 9:17 AM  Location procedure was performed: [facility] EMERGENCY DEPARTMENT    Performed by: Leticia Salinas NP  Authorized by: Nora Mendoza MD  Type: abscess (Paronychia)  Body area: upper extremity  Location details: left index finger  Scalpel size: 23 gauge needle.  Incision type: single straight  Complexity: simple  Drainage: pus  Drainage amount: copious  Wound treatment: incision, drainage and expression of material  Packing material: none        Labs Reviewed   POCT GLUCOSE   POCT GLUCOSE MONITORING CONTINUOUS          Imaging Results    None          Medications   mupirocin 2 % ointment 22 g (has no administration in time range)   cephALEXin capsule 500 mg (has no administration in time range)     Medical Decision  Making  63-year-old female with diabetes presenting to the ED for evaluation of pain and swelling to the left index finger.  No injury or trauma.  Differentials include fracture, dislocation, sprain, strain, contusion, paronychia, felon, flexor tenosynovitis, others.  Full physical exam as above.  Patient appears to have a paronychia which was drained per procedure note successfully.  Will place on oral and topical antibiotics.  Follow up with PCP.    Risk  Prescription drug management.            Scribe Attestation:   Scribe #1: I performed the above scribed service and the documentation accurately describes the services I performed. I attest to the accuracy of the note.                      I, GREGORIO Salinas, personally performed the services described in this documentation. All medical record entries made by the scribe were at my direction and in my presence. I have reviewed the chart and agree that the record reflects my personal performance and is accurate and complete.   Clinical Impression:   Final diagnoses:  [L03.012] Paronychia of left index finger (Primary)        ED Disposition Condition    Discharge Stable          ED Prescriptions       Medication Sig Dispense Start Date End Date Auth. Provider    cephALEXin (KEFLEX) 500 MG capsule Take 1 capsule (500 mg total) by mouth 3 (three) times daily. for 7 days 21 capsule 9/20/2023 9/27/2023 Leticia Salinas NP    mupirocin (BACTROBAN) 2 % ointment Apply topically 3 (three) times daily. for 7 days 15 g 9/20/2023 9/27/2023 Leticia Salinas NP          Follow-up Information       Follow up With Specialties Details Why Contact Info    Risa Villanueva MD Internal Medicine Schedule an appointment as soon as possible for a visit  For follow-up [address] [place] [address]  [address]  [facility] [address]  [phone]      [facility] [place] - Emergency Dept Emergency Medicine Go to  If symptoms worsen [address]  [facility] [id number] [phone]              Leticia Salinas, GULSHAN  09/20/23 0918

## 2023-10-12 DIAGNOSIS — Z12.31 SCREENING MAMMOGRAM, ENCOUNTER FOR: Primary | ICD-10-CM

## 2023-10-21 ENCOUNTER — HOSPITAL ENCOUNTER (OUTPATIENT)
Dept: MAMMOGRAPHY | Age: 63
Discharge: HOME | End: 2023-10-21
Attending: INTERNAL MEDICINE

## 2023-10-21 DIAGNOSIS — Z12.31 SCREENING MAMMOGRAM, ENCOUNTER FOR: ICD-10-CM

## 2023-10-21 PROCEDURE — 77067 SCR MAMMO BI INCL CAD: CPT

## 2024-04-18 ENCOUNTER — TELEPHONE (OUTPATIENT)
Dept: PODIATRY | Facility: CLINIC | Age: 64
End: 2024-04-18
Payer: MEDICARE

## 2024-08-15 DIAGNOSIS — Z12.31 ENCOUNTER FOR SCREENING MAMMOGRAM FOR MALIGNANT NEOPLASM OF BREAST: Primary | ICD-10-CM

## 2024-10-26 ENCOUNTER — APPOINTMENT (OUTPATIENT)
Dept: MAMMOGRAPHY | Age: 64
End: 2024-10-26
Attending: INTERNAL MEDICINE

## 2024-11-09 ENCOUNTER — HOSPITAL ENCOUNTER (OUTPATIENT)
Dept: MAMMOGRAPHY | Age: 64
Discharge: HOME | End: 2024-11-09
Attending: INTERNAL MEDICINE

## 2024-11-09 DIAGNOSIS — Z12.31 ENCOUNTER FOR SCREENING MAMMOGRAM FOR MALIGNANT NEOPLASM OF BREAST: ICD-10-CM

## 2024-11-09 PROCEDURE — 77067 SCR MAMMO BI INCL CAD: CPT
